# Patient Record
Sex: FEMALE | Race: BLACK OR AFRICAN AMERICAN | NOT HISPANIC OR LATINO | URBAN - METROPOLITAN AREA
[De-identification: names, ages, dates, MRNs, and addresses within clinical notes are randomized per-mention and may not be internally consistent; named-entity substitution may affect disease eponyms.]

---

## 2022-06-02 ENCOUNTER — EMERGENCY (EMERGENCY)
Facility: HOSPITAL | Age: 32
LOS: 1 days | Discharge: HOME | End: 2022-06-02
Attending: EMERGENCY MEDICINE
Payer: MEDICAID

## 2022-06-02 VITALS
RESPIRATION RATE: 18 BRPM | TEMPERATURE: 99 F | OXYGEN SATURATION: 98 % | SYSTOLIC BLOOD PRESSURE: 119 MMHG | HEART RATE: 87 BPM | WEIGHT: 154.98 LBS | DIASTOLIC BLOOD PRESSURE: 68 MMHG

## 2022-06-02 LAB
ANION GAP SERPL CALC-SCNC: 11 MMOL/L — SIGNIFICANT CHANGE UP (ref 5–17)
ANISOCYTOSIS BLD QL: SLIGHT — SIGNIFICANT CHANGE UP
APPEARANCE UR: CLEAR — SIGNIFICANT CHANGE UP
BACTERIA # UR AUTO: NEGATIVE — SIGNIFICANT CHANGE UP
BASOPHILS # BLD AUTO: 0.03 K/UL — SIGNIFICANT CHANGE UP (ref 0–0.2)
BASOPHILS NFR BLD AUTO: 0.4 % — SIGNIFICANT CHANGE UP (ref 0–2)
BILIRUB UR-MCNC: NEGATIVE — SIGNIFICANT CHANGE UP
BUN SERPL-MCNC: 6.2 MG/DL — LOW (ref 8–20)
CALCIUM SERPL-MCNC: 8.6 MG/DL — SIGNIFICANT CHANGE UP (ref 8.6–10.2)
CHLORIDE SERPL-SCNC: 101 MMOL/L — SIGNIFICANT CHANGE UP (ref 98–107)
CO2 SERPL-SCNC: 22 MMOL/L — SIGNIFICANT CHANGE UP (ref 22–29)
COLOR SPEC: YELLOW — SIGNIFICANT CHANGE UP
CREAT SERPL-MCNC: 0.73 MG/DL — SIGNIFICANT CHANGE UP (ref 0.5–1.3)
DIFF PNL FLD: ABNORMAL
EGFR: 113 ML/MIN/1.73M2 — SIGNIFICANT CHANGE UP
EOSINOPHIL # BLD AUTO: 0.04 K/UL — SIGNIFICANT CHANGE UP (ref 0–0.5)
EOSINOPHIL NFR BLD AUTO: 0.5 % — SIGNIFICANT CHANGE UP (ref 0–6)
EPI CELLS # UR: SIGNIFICANT CHANGE UP
FLUAV AG NPH QL: SIGNIFICANT CHANGE UP
FLUBV AG NPH QL: SIGNIFICANT CHANGE UP
GLUCOSE SERPL-MCNC: 102 MG/DL — HIGH (ref 70–99)
GLUCOSE UR QL: NEGATIVE MG/DL — SIGNIFICANT CHANGE UP
HCG SERPL-ACNC: <4 MIU/ML — SIGNIFICANT CHANGE UP
HCT VFR BLD CALC: 29.9 % — LOW (ref 34.5–45)
HGB BLD-MCNC: 9 G/DL — LOW (ref 11.5–15.5)
HIV 1 & 2 AB SERPL IA.RAPID: SIGNIFICANT CHANGE UP
IMM GRANULOCYTES NFR BLD AUTO: 0.4 % — SIGNIFICANT CHANGE UP (ref 0–1.5)
KETONES UR-MCNC: NEGATIVE — SIGNIFICANT CHANGE UP
LEUKOCYTE ESTERASE UR-ACNC: NEGATIVE — SIGNIFICANT CHANGE UP
LYMPHOCYTES # BLD AUTO: 0.92 K/UL — LOW (ref 1–3.3)
LYMPHOCYTES # BLD AUTO: 12 % — LOW (ref 13–44)
MANUAL SMEAR VERIFICATION: SIGNIFICANT CHANGE UP
MCHC RBC-ENTMCNC: 22 PG — LOW (ref 27–34)
MCHC RBC-ENTMCNC: 30.1 GM/DL — LOW (ref 32–36)
MCV RBC AUTO: 72.9 FL — LOW (ref 80–100)
MICROCYTES BLD QL: SLIGHT — SIGNIFICANT CHANGE UP
MONOCYTES # BLD AUTO: 0.56 K/UL — SIGNIFICANT CHANGE UP (ref 0–0.9)
MONOCYTES NFR BLD AUTO: 7.3 % — SIGNIFICANT CHANGE UP (ref 2–14)
NEUTROPHILS # BLD AUTO: 6.09 K/UL — SIGNIFICANT CHANGE UP (ref 1.8–7.4)
NEUTROPHILS NFR BLD AUTO: 79.4 % — HIGH (ref 43–77)
NITRITE UR-MCNC: NEGATIVE — SIGNIFICANT CHANGE UP
PH UR: 7 — SIGNIFICANT CHANGE UP (ref 5–8)
PLAT MORPH BLD: NORMAL — SIGNIFICANT CHANGE UP
PLATELET # BLD AUTO: 423 K/UL — HIGH (ref 150–400)
POIKILOCYTOSIS BLD QL AUTO: SLIGHT — SIGNIFICANT CHANGE UP
POLYCHROMASIA BLD QL SMEAR: SLIGHT — SIGNIFICANT CHANGE UP
POTASSIUM SERPL-MCNC: 4.1 MMOL/L — SIGNIFICANT CHANGE UP (ref 3.5–5.3)
POTASSIUM SERPL-SCNC: 4.1 MMOL/L — SIGNIFICANT CHANGE UP (ref 3.5–5.3)
PROT UR-MCNC: NEGATIVE — SIGNIFICANT CHANGE UP
RBC # BLD: 4.1 M/UL — SIGNIFICANT CHANGE UP (ref 3.8–5.2)
RBC # FLD: 18 % — HIGH (ref 10.3–14.5)
RBC BLD AUTO: ABNORMAL
RBC CASTS # UR COMP ASSIST: ABNORMAL /HPF (ref 0–4)
RSV RNA NPH QL NAA+NON-PROBE: SIGNIFICANT CHANGE UP
S PYO DNA THROAT QL NAA+PROBE: SIGNIFICANT CHANGE UP
SARS-COV-2 RNA SPEC QL NAA+PROBE: SIGNIFICANT CHANGE UP
SODIUM SERPL-SCNC: 134 MMOL/L — LOW (ref 135–145)
SP GR SPEC: 1.01 — SIGNIFICANT CHANGE UP (ref 1.01–1.02)
UROBILINOGEN FLD QL: NEGATIVE MG/DL — SIGNIFICANT CHANGE UP
WBC # BLD: 7.67 K/UL — SIGNIFICANT CHANGE UP (ref 3.8–10.5)
WBC # FLD AUTO: 7.67 K/UL — SIGNIFICANT CHANGE UP (ref 3.8–10.5)
WBC UR QL: SIGNIFICANT CHANGE UP /HPF (ref 0–5)

## 2022-06-02 PROCEDURE — 96375 TX/PRO/DX INJ NEW DRUG ADDON: CPT

## 2022-06-02 PROCEDURE — 99284 EMERGENCY DEPT VISIT MOD MDM: CPT

## 2022-06-02 PROCEDURE — 96374 THER/PROPH/DIAG INJ IV PUSH: CPT

## 2022-06-02 PROCEDURE — 81001 URINALYSIS AUTO W/SCOPE: CPT

## 2022-06-02 PROCEDURE — 87651 STREP A DNA AMP PROBE: CPT

## 2022-06-02 PROCEDURE — 80048 BASIC METABOLIC PNL TOTAL CA: CPT

## 2022-06-02 PROCEDURE — 93005 ELECTROCARDIOGRAM TRACING: CPT

## 2022-06-02 PROCEDURE — 86703 HIV-1/HIV-2 1 RESULT ANTBDY: CPT

## 2022-06-02 PROCEDURE — 87086 URINE CULTURE/COLONY COUNT: CPT

## 2022-06-02 PROCEDURE — 87077 CULTURE AEROBIC IDENTIFY: CPT

## 2022-06-02 PROCEDURE — 87798 DETECT AGENT NOS DNA AMP: CPT

## 2022-06-02 PROCEDURE — 36415 COLL VENOUS BLD VENIPUNCTURE: CPT

## 2022-06-02 PROCEDURE — 93010 ELECTROCARDIOGRAM REPORT: CPT

## 2022-06-02 PROCEDURE — 87637 SARSCOV2&INF A&B&RSV AMP PRB: CPT

## 2022-06-02 PROCEDURE — 84702 CHORIONIC GONADOTROPIN TEST: CPT

## 2022-06-02 PROCEDURE — 85025 COMPLETE CBC W/AUTO DIFF WBC: CPT

## 2022-06-02 RX ORDER — SODIUM CHLORIDE 9 MG/ML
1000 INJECTION INTRAMUSCULAR; INTRAVENOUS; SUBCUTANEOUS ONCE
Refills: 0 | Status: COMPLETED | OUTPATIENT
Start: 2022-06-02 | End: 2022-06-02

## 2022-06-02 RX ORDER — METOCLOPRAMIDE HCL 10 MG
10 TABLET ORAL ONCE
Refills: 0 | Status: COMPLETED | OUTPATIENT
Start: 2022-06-02 | End: 2022-06-02

## 2022-06-02 RX ORDER — KETOROLAC TROMETHAMINE 30 MG/ML
15 SYRINGE (ML) INJECTION ONCE
Refills: 0 | Status: DISCONTINUED | OUTPATIENT
Start: 2022-06-02 | End: 2022-06-02

## 2022-06-02 RX ORDER — DEXAMETHASONE 0.5 MG/5ML
6 ELIXIR ORAL ONCE
Refills: 0 | Status: COMPLETED | OUTPATIENT
Start: 2022-06-02 | End: 2022-06-02

## 2022-06-02 RX ORDER — DIPHENHYDRAMINE HCL 50 MG
25 CAPSULE ORAL ONCE
Refills: 0 | Status: COMPLETED | OUTPATIENT
Start: 2022-06-02 | End: 2022-06-02

## 2022-06-02 RX ADMIN — Medication 15 MILLIGRAM(S): at 05:30

## 2022-06-02 RX ADMIN — Medication 6 MILLIGRAM(S): at 05:30

## 2022-06-02 RX ADMIN — SODIUM CHLORIDE 1000 MILLILITER(S): 9 INJECTION INTRAMUSCULAR; INTRAVENOUS; SUBCUTANEOUS at 05:31

## 2022-06-02 RX ADMIN — Medication 25 MILLIGRAM(S): at 05:31

## 2022-06-02 RX ADMIN — Medication 10 MILLIGRAM(S): at 05:31

## 2022-06-02 NOTE — ED PROVIDER NOTE - PHYSICAL EXAMINATION
Constitutional: Awake, alert, in no acute distress, wearing sunglasses  Eyes: no scleral icterus, PERRL, EOMI  HENT: normocephalic, atraumatic, moist oral mucosa, no pharyngeal erythema or exudate  Neck: supple, +mild left anterior cervical lymphadenopathy  CV: RRR, no murmur, 2+ distal pulses in all extremities  Pulm: non-labored respirations, CTAB  Abdomen: soft, non-tender, non-distended  Extremities: no edema, no deformity  Skin: no rash, no jaundice  Neuro: AAOx3, CNs II-XII intact, no facial asymmetry, 5/5 strength and sensation in all extremities, no finger-to-nose dysmetria, stable gait

## 2022-06-02 NOTE — ED PROVIDER NOTE - PATIENT PORTAL LINK FT
You can access the FollowMyHealth Patient Portal offered by [facility] by registering at the following website: [email or web address]. By joining [facility]’s FollowMyHealth portal, you will also be able to view your health information using other applications (apps) compatible with our system.

## 2022-06-02 NOTE — ED PROVIDER NOTE - CLINICAL SUMMARY MEDICAL DECISION MAKING FREE TEXT BOX
31y F presenting for few days of migraine headache and sore throat. Also reporting syncopal episode few days ago. Pt well appearing and nontoxic, with nonfocal neuro exam. Will check labs, EKG, strep and COVID/flu swabs. Treat symptomatically, reassess.

## 2022-06-02 NOTE — ED PROVIDER NOTE - NS ED ROS FT
Constitutional: no fever, no chills  Eyes: no vision changes  ENT: no nasal congestion, +sore throat  CV: no chest pain  Resp: no cough, no shortness of breath  GI: no abdominal pain, no vomiting, no diarrhea  : no dysuria  MSK: no joint pain  Skin: no rash  Neuro: +headache, no focal weakness, no paresthesias

## 2022-06-02 NOTE — ED ADULT TRIAGE NOTE - CHIEF COMPLAINT QUOTE
Patient presents to ED c/o migraine + sore throat for the last 2-3 days. Denies blurry vision, dizziness. States she has floaters in her eyes. Has been medicating for pain with Ibuprofen every 4-6 hours without relief.

## 2022-06-02 NOTE — ED PROVIDER NOTE - OBJECTIVE STATEMENT
31y F w/ hx migraines, presenting for headache. Pt reports gradual onset headache since 4 days ago, described as discomfort to bitemporal and occipital area, associated with nausea, decreased appetite and light sensitivity. Reports prior hx of similar headaches, but usually not this severe. Also complaining of sore throat and pain with swallowing. Denies fever, cough, vomiting, diarrhea, chest pain, SOB, palpitations. Notes that she had a brief syncopal episode after getting up suddenly a few days ago. Did not sustain any injury. Last took ibuprofen 600 mg at 3 PM today. Pt is not vaccinated for COVID.
negative...

## 2022-06-02 NOTE — ED ADULT NURSE NOTE - OBJECTIVE STATEMENT
Assumed care of pt at 0530 in . Pt A&Ox4 c/o migraine and a sore throat that started on Sunday, pt now denies blurry vision and dizziness, pt admits to have been medicating for pain with Ibuprofen every 4-6 hours without relief and the last time she took it was at 3pm, pt states she has a history of getting bad headaches but they normally go away and come back, Resident Bandar at bedside, pt resting comfortably showing no signs of respiratory distress

## 2022-06-02 NOTE — ED PROVIDER NOTE - ATTENDING CONTRIBUTION TO CARE
I personally saw the patient with the resident, and completed the key components of the history and physical exam. I then discussed the management plan with the resident.     Constitutional: Awake, alert, in no acute distress, wearing sunglasses  Eyes: no scleral icterus, PERRL, EOMI  HENT: normocephalic, atraumatic, moist oral mucosa, no pharyngeal erythema or exudate  Neck: supple, +mild left anterior cervical lymphadenopathy  CV: RRR, no murmur, 2+ distal pulses in all extremities  Pulm: non-labored respirations, CTAB  Abdomen: soft, non-tender, non-distended  Extremities: no edema, no deformity  Skin: no rash, no jaundice  Neuro: AAOx3, CNs II-XII intact, no facial asymmetry, 5/5 strength and sensation in all extremities, no finger-to-nose dysmetria, stable gait

## 2022-06-02 NOTE — ED PROVIDER NOTE - NSFOLLOWUPINSTRUCTIONS_ED_ALL_ED_FT
Migraine Headache      A migraine headache is an intense, throbbing pain on one side or both sides of the head. Migraine headaches may also cause other symptoms, such as nausea, vomiting, and sensitivity to light and noise. A migraine headache can last from 4 hours to 3 days. Talk with your doctor about what things may bring on (trigger) your migraine headaches.      What are the causes?    The exact cause of this condition is not known. However, a migraine may be caused when nerves in the brain become irritated and release chemicals that cause inflammation of blood vessels. This inflammation causes pain. This condition may be triggered or caused by:  •Drinking alcohol.      •Smoking.    •Taking medicines, such as:  •Medicine used to treat chest pain (nitroglycerin).      •Birth control pills.      •Estrogen.      •Certain blood pressure medicines.        •Eating or drinking products that contain nitrates, glutamate, aspartame, or tyramine. Aged cheeses, chocolate, or caffeine may also be triggers.      •Doing physical activity.      Other things that may trigger a migraine headache include:  •Menstruation.      •Pregnancy.      •Hunger.      •Stress.      •Lack of sleep or too much sleep.      •Weather changes.      •Fatigue.        What increases the risk?    The following factors may make you more likely to experience migraine headaches:  •Being a certain age. This condition is more common in people who are 25–55 years old.      •Being female.      •Having a family history of migraine headaches.      •Being .      •Having a mental health condition, such as depression or anxiety.      •Being obese.        What are the signs or symptoms?    The main symptom of this condition is pulsating or throbbing pain. This pain may:  •Happen in any area of the head, such as on one side or both sides.      •Interfere with daily activities.      •Get worse with physical activity.      •Get worse with exposure to bright lights or loud noises.      Other symptoms may include:  •Nausea.      •Vomiting.      •Dizziness.      •General sensitivity to bright lights, loud noises, or smells.      Before you get a migraine headache, you may get warning signs (an aura). An aura may include:  •Seeing flashing lights or having blind spots.      •Seeing bright spots, halos, or zigzag lines.      •Having tunnel vision or blurred vision.      •Having numbness or a tingling feeling.      •Having trouble talking.      •Having muscle weakness.      Some people have symptoms after a migraine headache (postdromal phase), such as:  •Feeling tired.      •Difficulty concentrating.        How is this diagnosed?    A migraine headache can be diagnosed based on:  •Your symptoms.      •A physical exam.    •Tests, such as:   •CT scan or an MRI of the head. These imaging tests can help rule out other causes of headaches.      •Taking fluid from the spine (lumbar puncture) and analyzing it (cerebrospinal fluid analysis, or CSF analysis).          How is this treated?    This condition may be treated with medicines that:  •Relieve pain.      •Relieve nausea.      •Prevent migraine headaches.      Treatment for this condition may also include:  •Acupuncture.      •Lifestyle changes like avoiding foods that trigger migraine headaches.      •Biofeedback.      •Cognitive behavioral therapy.        Follow these instructions at home:    Medicines     •Take over-the-counter and prescription medicines only as told by your health care provider.    •Ask your health care provider if the medicine prescribed to you:  •Requires you to avoid driving or using heavy machinery.    •Can cause constipation. You may need to take these actions to prevent or treat constipation:  •Drink enough fluid to keep your urine pale yellow.      •Take over-the-counter or prescription medicines.      •Eat foods that are high in fiber, such as beans, whole grains, and fresh fruits and vegetables.      •Limit foods that are high in fat and processed sugars, such as fried or sweet foods.          Lifestyle     • Do not drink alcohol.      • Do not use any products that contain nicotine or tobacco, such as cigarettes, e-cigarettes, and chewing tobacco. If you need help quitting, ask your health care provider.      •Get at least 8 hours of sleep every night.      •Find ways to manage stress, such as meditation, deep breathing, or yoga.        General instructions                 •Keep a journal to find out what may trigger your migraine headaches. For example, write down:  •What you eat and drink.      •How much sleep you get.      •Any change to your diet or medicines.      •If you have a migraine headache:  •Avoid things that make your symptoms worse, such as bright lights.      •It may help to lie down in a dark, quiet room.      •Do not drive or use heavy machinery.      •Ask your health care provider what activities are safe for you while you are experiencing symptoms.        •Keep all follow-up visits as told by your health care provider. This is important.        Contact a health care provider if:    •You develop symptoms that are different or more severe than your usual migraine headache symptoms.      •You have more than 15 headache days in one month.        Get help right away if:    •Your migraine headache becomes severe.      •Your migraine headache lasts longer than 72 hours.      •You have a fever.      •You have a stiff neck.      •You have vision loss.      •Your muscles feel weak or like you cannot control them.      •You start to lose your balance often.      •You have trouble walking.      •You faint.      •You have a seizure.        Summary    •A migraine headache is an intense, throbbing pain on one side or both sides of the head. Migraines may also cause other symptoms, such as nausea, vomiting, and sensitivity to light and noise.      •This condition may be treated with medicines and lifestyle changes. You may also need to avoid certain things that trigger a migraine headache.      •Keep a journal to find out what may trigger your migraine headaches.      •Contact your health care provider if you have more than 15 headache days in a month or you develop symptoms that are different or more severe than your usual migraine headache symptoms.      This information is not intended to replace advice given to you by your health care provider. Make sure you discuss any questions you have with your health care provider.

## 2022-06-02 NOTE — ED PROVIDER NOTE - CARE PROVIDER_API CALL
Shelley Barrett (DO)  EEGEpilepsy; Neurology  [address] [place] [address]  Phone: [phone]  Fax: [phone]  Follow Up Time:

## 2022-06-03 LAB
CULTURE RESULTS: SIGNIFICANT CHANGE UP
SPECIMEN SOURCE: SIGNIFICANT CHANGE UP